# Patient Record
Sex: MALE | Race: WHITE | URBAN - METROPOLITAN AREA
[De-identification: names, ages, dates, MRNs, and addresses within clinical notes are randomized per-mention and may not be internally consistent; named-entity substitution may affect disease eponyms.]

---

## 2021-04-12 ENCOUNTER — OFFICE VISIT (OUTPATIENT)
Dept: OTOLARYNGOLOGY | Facility: CLINIC | Age: 61
End: 2021-04-12
Payer: COMMERCIAL

## 2021-04-12 VITALS
DIASTOLIC BLOOD PRESSURE: 66 MMHG | SYSTOLIC BLOOD PRESSURE: 103 MMHG | TEMPERATURE: 97 F | BODY MASS INDEX: 27.17 KG/M2 | HEIGHT: 73 IN | HEART RATE: 67 BPM | WEIGHT: 205 LBS

## 2021-04-12 DIAGNOSIS — J34.2 DEVIATED SEPTUM: Primary | ICD-10-CM

## 2021-04-12 DIAGNOSIS — H93.13 TINNITUS OF BOTH EARS: ICD-10-CM

## 2021-04-12 PROCEDURE — 3074F SYST BP LT 130 MM HG: CPT | Performed by: OTOLARYNGOLOGY

## 2021-04-12 PROCEDURE — 3078F DIAST BP <80 MM HG: CPT | Performed by: OTOLARYNGOLOGY

## 2021-04-12 PROCEDURE — 3008F BODY MASS INDEX DOCD: CPT | Performed by: OTOLARYNGOLOGY

## 2021-04-12 RX ORDER — PANTOPRAZOLE SODIUM 40 MG/1
TABLET, DELAYED RELEASE ORAL
COMMUNITY

## 2021-04-13 NOTE — PROGRESS NOTES
Monroe Carr is a 63year old male.  Patient presents with:  Sinus Problem: deviated septum   Ear Problem: ringing in ears, started 3rd of April     HPI:   He was playing the organ in [employer] during Easter and afterwards noted that he was experiencing a ringi Neurological Normal Memory - Normal. Cranial nerves - Cranial nerves II through XII grossly intact.    Neck Exam Normal Inspection - Normal. Palpation - Normal. Parotid gland - Normal. Thyroid gland - Normal.   Psychiatric Normal Orientation - Oriented to

## 2021-05-04 ENCOUNTER — OFFICE VISIT (OUTPATIENT)
Dept: OTOLARYNGOLOGY | Facility: CLINIC | Age: 61
End: 2021-05-04
Payer: COMMERCIAL

## 2021-05-04 VITALS
SYSTOLIC BLOOD PRESSURE: 109 MMHG | DIASTOLIC BLOOD PRESSURE: 74 MMHG | TEMPERATURE: 97 F | WEIGHT: 206 LBS | HEIGHT: 73 IN | BODY MASS INDEX: 27.3 KG/M2

## 2021-05-04 DIAGNOSIS — J34.2 DEVIATED NASAL SEPTUM: Primary | ICD-10-CM

## 2021-05-04 PROCEDURE — 99213 OFFICE O/P EST LOW 20 MIN: CPT | Performed by: OTOLARYNGOLOGY

## 2021-05-04 PROCEDURE — 3008F BODY MASS INDEX DOCD: CPT | Performed by: OTOLARYNGOLOGY

## 2021-05-04 PROCEDURE — 3074F SYST BP LT 130 MM HG: CPT | Performed by: OTOLARYNGOLOGY

## 2021-05-04 PROCEDURE — 3078F DIAST BP <80 MM HG: CPT | Performed by: OTOLARYNGOLOGY

## 2021-05-04 RX ORDER — LEVOFLOXACIN 500 MG/1
TABLET, FILM COATED ORAL
COMMUNITY
Start: 2021-05-03

## 2021-05-04 NOTE — PROGRESS NOTES
Katey Ruggiero is a 63year old male.   Patient presents with:  Nose Problem: pt would like to discuss deviated nasal septum surgery, c/o trouble breathing through both nostrils  Ear Problem: ringing in both ears since April 3, 2021       HISTORY OF PRESENT IL (Tympanic)   Ht 6' 1\" (1.854 m)   Wt 206 lb (93.4 kg)   BMI 27.18 kg/m²        Constitutional Normal Overall appearance - Normal.   Psychiatric Normal Orientation - Oriented to time, place, person & situation. Appropriate mood and affect.    Neck Exam Norm despite surgery she accepts these risks and wishes to proceed. I have recommended that he be cleared medically prior to surgery. This note was prepared using [employer] voice recognition dictation software. As a result errors may occur.  When id

## 2021-05-21 ENCOUNTER — TELEPHONE (OUTPATIENT)
Dept: OTOLARYNGOLOGY | Facility: CLINIC | Age: 61
End: 2021-05-21

## 2021-06-08 NOTE — TELEPHONE ENCOUNTER
Georgie calling from [address] [place] [address] needs procedure code for surgery on 6/10 needs PA has HMO  please advise       Phone # for [phone]

## 2021-06-09 ENCOUNTER — TELEPHONE (OUTPATIENT)
Dept: OTOLARYNGOLOGY | Facility: CLINIC | Age: 61
End: 2021-06-09

## 2021-06-09 NOTE — TELEPHONE ENCOUNTER
Spoke to [place]. She stated authorization needs to be initiated by Dr. Lauryn Bond. [phone], and fax authorization to [employer] at [phone].

## 2021-06-09 NOTE — TELEPHONE ENCOUNTER
Romana Amor from [facility] calling for authorization for surgery.  Please advise

## 2021-07-12 ENCOUNTER — TELEPHONE (OUTPATIENT)
Dept: OTOLARYNGOLOGY | Facility: CLINIC | Age: 61
End: 2021-07-12

## 2021-07-13 ENCOUNTER — TELEPHONE (OUTPATIENT)
Dept: OTOLARYNGOLOGY | Facility: CLINIC | Age: 61
End: 2021-07-13

## 2021-07-13 NOTE — TELEPHONE ENCOUNTER
Patient is scheduled on 7/22/21 at [facility] with Dr. Lucita Joyce # [phone].

## 2021-07-14 NOTE — TELEPHONE ENCOUNTER
Informed patient that [facility] will contact him today for arrival time between 2-5pm today.

## 2021-07-21 ENCOUNTER — TELEPHONE (OUTPATIENT)
Dept: OTOLARYNGOLOGY | Facility: CLINIC | Age: 61
End: 2021-07-21

## 2021-07-21 NOTE — TELEPHONE ENCOUNTER
Dr Eliezer Couch made aware of note below and informed that pt does not have any medical clearance or H&P,Rn called Dr Rodriguez Hem office pt last visit was on 4/5/21 and did not mention anything about medical clearance,per Dr Eliezer Couch need to cancel surgery. Rn informe

## 2021-07-21 NOTE — TELEPHONE ENCOUNTER
simi from [address] [place] [address]. Pt is scheduled for surgery tomorrow 7-22-21. Need consent order. Does pt need to have a blood test.  Fax number [phone].   Please call

## (undated) NOTE — LETTER
[address] [facility]       05/04/21        Patient: Lele Rosen   YOB: 1960   Date of Visit: 5/4/2021       Dear  Dr. Julio C Mcclure MD,      Thank you for referring Lele Rosen to my practice.   Carlos